# Patient Record
Sex: MALE | Race: WHITE
[De-identification: names, ages, dates, MRNs, and addresses within clinical notes are randomized per-mention and may not be internally consistent; named-entity substitution may affect disease eponyms.]

---

## 2018-05-24 ENCOUNTER — HOSPITAL ENCOUNTER (OUTPATIENT)
Dept: HOSPITAL 59 - SUR | Age: 58
Discharge: HOME | End: 2018-05-24
Attending: ORTHOPAEDIC SURGERY
Payer: COMMERCIAL

## 2018-05-24 DIAGNOSIS — M65.342: Primary | ICD-10-CM

## 2018-05-24 DIAGNOSIS — E11.9: ICD-10-CM

## 2018-05-24 DIAGNOSIS — Z79.84: ICD-10-CM

## 2018-05-24 PROCEDURE — 01810 ANES PX NRV MUSC F/ARM WRST: CPT

## 2018-05-24 PROCEDURE — 26055 INCISE FINGER TENDON SHEATH: CPT

## 2018-05-25 NOTE — OPERATIVE NOTE
DATE OF SURGERY: 05/24/2018



Surgeon: John Ward DO



PREOPERATIVE DIAGNOSIS: Trigger finger of the left ring finger. 



POSTOPERATIVE DIAGNOSIS: Trigger finger of the left ring finger. 



OPERATION: Tenotomy A1 pulley left ring finger using 3.5 loop magnification. 



DESCRIPTION OF PROCEDURE: This 57-year-old male was taken to the operating room and placed in the 
supine position on the operating room table. General anesthetic was administered. The left upper 
extremity was elevated. It was prepped with Hibiclens and draped in the usual sterile fashion. It 
was exsanguinated and the tourniquet inflated to 250 mmHg. 



A palmar incision was utilized following the line of the extensor sublimis tendon and approximately 
1.5 cm to 2 cm incision was made longitudinal over the 4th metacarpal head. Dissection was carried 
down through the skin and subcutaneous tissue. The proximal edge of the A1 pulley was then easily 
identified and split from its proximal to its distal margin under direct vision. Some incision of 
the tenosynovium was also performed because of extensive thickness but the tendon itself was not 
disturbed. After the pulley was opened, the finger was taken through range of motion and found to be 
satisfactory with no evidence of impingement. The wound was irrigated and the wound closed with 
interrupted 6-0 nylon suture. Sterile dressings were applied. The patient taken to the recovery room 
in satisfactory condition. 



GROSS PATHOLOGY: This patient demonstrated a nodularity of the sublimis tendon to the ring finger 
with some thickening of the tenosynovium also being identified. CC: [email or web address] TORO